# Patient Record
Sex: FEMALE | Race: WHITE | Employment: FULL TIME | URBAN - METROPOLITAN AREA
[De-identification: names, ages, dates, MRNs, and addresses within clinical notes are randomized per-mention and may not be internally consistent; named-entity substitution may affect disease eponyms.]

---

## 2017-01-20 ENCOUNTER — TELEPHONE (OUTPATIENT)
Dept: NEUROLOGY | Age: 57
End: 2017-01-20

## 2018-05-21 ENCOUNTER — HOSPITAL ENCOUNTER (OUTPATIENT)
Dept: CT IMAGING | Age: 58
Discharge: HOME | End: 2018-05-21
Admitting: FAMILY MEDICINE

## 2018-05-21 DIAGNOSIS — R10.31 RLQ ABDOMINAL PAIN: ICD-10-CM

## 2018-05-21 DIAGNOSIS — R10.31 RIGHT LOWER QUADRANT PAIN: ICD-10-CM

## 2018-05-21 PROBLEM — K35.80 ACUTE APPENDICITIS: Status: ACTIVE | Noted: 2018-05-21

## 2018-05-21 LAB
ANION GAP SERPL CALCULATED.3IONS-SCNC: 11 MMOL/L (ref 3–16)
BUN BLDV-MCNC: 20 MG/DL (ref 7–20)
CALCIUM SERPL-MCNC: 9.4 MG/DL (ref 8.3–10.6)
CHLORIDE BLD-SCNC: 101 MMOL/L (ref 99–110)
CO2: 26 MMOL/L (ref 21–32)
CREAT SERPL-MCNC: 0.8 MG/DL (ref 0.6–1.1)
GFR AFRICAN AMERICAN: >60
GFR NON-AFRICAN AMERICAN: >60
GLUCOSE BLD-MCNC: 163 MG/DL (ref 70–99)
HCT VFR BLD CALC: 44.1 % (ref 36–48)
HEMOGLOBIN: 14.7 G/DL (ref 12–16)
MCH RBC QN AUTO: 27.9 PG (ref 26–34)
MCHC RBC AUTO-ENTMCNC: 33.3 G/DL (ref 31–36)
MCV RBC AUTO: 83.6 FL (ref 80–100)
PDW BLD-RTO: 13.6 % (ref 12.4–15.4)
PLATELET # BLD: 186 K/UL (ref 135–450)
PMV BLD AUTO: 10.3 FL (ref 5–10.5)
POTASSIUM SERPL-SCNC: 4.3 MMOL/L (ref 3.5–5.1)
RBC # BLD: 5.27 M/UL (ref 4–5.2)
SODIUM BLD-SCNC: 138 MMOL/L (ref 136–145)
WBC # BLD: 11.4 K/UL (ref 4–11)

## 2018-05-22 ENCOUNTER — HOSPITAL ENCOUNTER (OUTPATIENT)
Dept: CT IMAGING | Age: 58
Discharge: HOME | End: 2018-05-23
Attending: FAMILY MEDICINE | Admitting: FAMILY MEDICINE

## 2018-05-22 DIAGNOSIS — R10.31 RIGHT LOWER QUADRANT PAIN: ICD-10-CM

## 2018-05-22 DIAGNOSIS — R10.31 RLQ ABDOMINAL PAIN: ICD-10-CM

## 2018-06-06 ENCOUNTER — OFFICE VISIT (OUTPATIENT)
Dept: SURGERY | Age: 58
End: 2018-06-06

## 2018-06-06 VITALS — DIASTOLIC BLOOD PRESSURE: 62 MMHG | SYSTOLIC BLOOD PRESSURE: 118 MMHG

## 2018-06-06 DIAGNOSIS — K35.80 ACUTE APPENDICITIS, UNCOMPLICATED: Primary | ICD-10-CM

## 2018-06-06 PROCEDURE — 99024 POSTOP FOLLOW-UP VISIT: CPT | Performed by: SURGERY

## 2018-06-13 ENCOUNTER — OFFICE VISIT (OUTPATIENT)
Dept: SURGERY | Age: 58
End: 2018-06-13

## 2018-06-13 VITALS — SYSTOLIC BLOOD PRESSURE: 120 MMHG | DIASTOLIC BLOOD PRESSURE: 60 MMHG

## 2018-06-13 DIAGNOSIS — K35.80 ACUTE APPENDICITIS, UNCOMPLICATED: Primary | ICD-10-CM

## 2018-06-13 PROCEDURE — 99024 POSTOP FOLLOW-UP VISIT: CPT | Performed by: SURGERY

## 2018-07-05 ENCOUNTER — TELEPHONE (OUTPATIENT)
Dept: PRIMARY CARE CLINIC | Age: 58
End: 2018-07-05

## 2018-07-05 RX ORDER — FLUCONAZOLE 100 MG/1
100 TABLET ORAL DAILY
Qty: 1 TABLET | Refills: 0 | Status: SHIPPED | OUTPATIENT
Start: 2018-07-05 | End: 2018-07-12 | Stop reason: ALTCHOICE

## 2018-07-10 ENCOUNTER — OFFICE VISIT (OUTPATIENT)
Dept: PRIMARY CARE CLINIC | Age: 58
End: 2018-07-10

## 2018-07-10 VITALS
HEIGHT: 65 IN | WEIGHT: 128 LBS | DIASTOLIC BLOOD PRESSURE: 84 MMHG | BODY MASS INDEX: 21.33 KG/M2 | HEART RATE: 76 BPM | SYSTOLIC BLOOD PRESSURE: 132 MMHG | RESPIRATION RATE: 14 BRPM

## 2018-07-10 DIAGNOSIS — N30.00 ACUTE CYSTITIS WITHOUT HEMATURIA: Primary | ICD-10-CM

## 2018-07-10 DIAGNOSIS — N76.0 ACUTE VAGINITIS: ICD-10-CM

## 2018-07-10 DIAGNOSIS — N88.9 CERVICAL LESION: ICD-10-CM

## 2018-07-10 DIAGNOSIS — N39.3 URINARY, INCONTINENCE, STRESS FEMALE: ICD-10-CM

## 2018-07-10 DIAGNOSIS — Z11.3 ROUTINE SCREENING FOR STI (SEXUALLY TRANSMITTED INFECTION): ICD-10-CM

## 2018-07-10 LAB
BILIRUBIN, POC: NORMAL
BLOOD URINE, POC: NORMAL
CLARITY, POC: CLEAR
COLOR, POC: YELLOW
GLUCOSE URINE, POC: NORMAL
KETONES, POC: NORMAL
LEUKOCYTE EST, POC: NORMAL
NITRITE, POC: NORMAL
PH, POC: 7
PROTEIN, POC: NORMAL
SPECIFIC GRAVITY, POC: 1.02
UROBILINOGEN, POC: NORMAL

## 2018-07-10 PROCEDURE — 81002 URINALYSIS NONAUTO W/O SCOPE: CPT | Performed by: NURSE PRACTITIONER

## 2018-07-10 PROCEDURE — 87899 AGENT NOS ASSAY W/OPTIC: CPT | Performed by: NURSE PRACTITIONER

## 2018-07-10 PROCEDURE — 99214 OFFICE O/P EST MOD 30 MIN: CPT | Performed by: NURSE PRACTITIONER

## 2018-07-10 ASSESSMENT — ENCOUNTER SYMPTOMS
SHORTNESS OF BREATH: 0
CHEST TIGHTNESS: 0
COUGH: 0
DIARRHEA: 0
RECTAL PAIN: 1
NAUSEA: 0
CONSTIPATION: 0

## 2018-07-10 ASSESSMENT — PATIENT HEALTH QUESTIONNAIRE - PHQ9
SUM OF ALL RESPONSES TO PHQ QUESTIONS 1-9: 0
SUM OF ALL RESPONSES TO PHQ9 QUESTIONS 1 & 2: 0
1. LITTLE INTEREST OR PLEASURE IN DOING THINGS: 0
2. FEELING DOWN, DEPRESSED OR HOPELESS: 0

## 2018-07-10 NOTE — PROGRESS NOTES
colonoscopy  05/24/2009    Lipid screen  10/11/2015    Flu vaccine (1) 09/01/2018      Social History     Social History    Marital status:      Spouse name: N/A    Number of children: N/A    Years of education: N/A     Social History Main Topics    Smoking status: Never Smoker    Smokeless tobacco: Never Used    Alcohol use Yes      Comment: occas    Drug use: No    Sexual activity: Not Asked     Other Topics Concern    None     Social History Narrative    None     Family History   Problem Relation Age of Onset    Hypertension Other     Stroke Other     Diabetes Other     Hypertension Mother     Depression Mother     Heart Disease Mother     Hypertension Father     High Cholesterol Father     Depression Father     Stroke Father     Migraines Child     Depression Child     Anxiety Disorder Child      Patient Active Problem List   Diagnosis    Migraine with aura, intractable    Migraine without aura, intractable    Atrial fibrillation (HCC)    Weight gain due to medication    Insomnia    Chest pain    Cervicalgia    Intractable migraine with aura without status migrainosus    Acute appendicitis, uncomplicated        LABS:  Admission on 05/21/2018, Discharged on 05/23/2018   Component Date Value Ref Range Status    WBC 05/21/2018 10.1  4.0 - 11.0 K/uL Final    RBC 05/21/2018 5.28* 4.00 - 5.20 M/uL Final    Hemoglobin 05/21/2018 14.7  12.0 - 16.0 g/dL Final    Hematocrit 05/21/2018 44.0  36.0 - 48.0 % Final    MCV 05/21/2018 83.3  80.0 - 100.0 fL Final    MCH 05/21/2018 27.8  26.0 - 34.0 pg Final    MCHC 05/21/2018 33.3  31.0 - 36.0 g/dL Final    RDW 05/21/2018 13.7  12.4 - 15.4 % Final    Platelets 78/70/8430 185  135 - 450 K/uL Final    MPV 05/21/2018 10.0  5.0 - 10.5 fL Final    Sodium 05/21/2018 137  136 - 145 mmol/L Final    Potassium 05/21/2018 3.9  3.5 - 5.1 mmol/L Final    Chloride 05/21/2018 103  99 - 110 mmol/L Final    CO2 05/21/2018 22  21 - 32 mmol/L Final    Anion Gap 05/21/2018 12  3 - 16 Final    Glucose 05/21/2018 98  70 - 99 mg/dL Final    BUN 05/21/2018 19  7 - 20 mg/dL Final    CREATININE 05/21/2018 0.8  0.6 - 1.1 mg/dL Final    GFR Non- 05/21/2018 >60  >60 Final    Comment: >60 mL/min/1.73m2 EGFR, calc. for ages 25 and older using the  MDRD formula (not corrected for weight), is valid for stable  renal function.  GFR  05/21/2018 >60  >60 Final    Comment: Chronic Kidney Disease: less than 60 ml/min/1.73 sq.m. Kidney Failure: less than 15 ml/min/1.73 sq.m. Results valid for patients 18 years and older.       Calcium 05/21/2018 9.3  8.3 - 10.6 mg/dL Final    Ventricular Rate 05/22/2018 67  BPM Final    Atrial Rate 05/22/2018 67  BPM Final    P-R Interval 05/22/2018 144  ms Final    QRS Duration 05/22/2018 90  ms Final    Q-T Interval 05/22/2018 380  ms Final    QTc Calculation (Bazett) 05/22/2018 401  ms Final    P Axis 05/22/2018 38  degrees Final    R Axis 05/22/2018 58  degrees Final    T Axis 05/22/2018 -23  degrees Final    Diagnosis 05/22/2018    Final                    Value:Normal sinus rhythm  ST & T wave abnormality, consider inferior ischemia  Abnormal ECG  When compared with ECG of 02-JUN-2015 13:46,  Aberrant conduction is no longer Present  T wave inversion no longer evident in Lateral leads  Confirmed by Farhad Bautista MD, Arnulfo Calderon ([id number]) on 5/23/2018 12:19:03 PM      WBC 05/23/2018 9.8  4.0 - 11.0 K/uL Final    RBC 05/23/2018 4.76  4.00 - 5.20 M/uL Final    Hemoglobin 05/23/2018 13.3  12.0 - 16.0 g/dL Final    Hematocrit 05/23/2018 40.0  36.0 - 48.0 % Final    MCV 05/23/2018 84.0  80.0 - 100.0 fL Final    MCH 05/23/2018 28.0  26.0 - 34.0 pg Final    MCHC 05/23/2018 33.3  31.0 - 36.0 g/dL Final    RDW 05/23/2018 13.6  12.4 - 15.4 % Final    Platelets 08/10/7673 164  135 - 450 K/uL Final    MPV 05/23/2018 10.2  5.0 - 10.5 fL Final          PHYSICAL EXAM  /84 (Site: Right

## 2018-07-11 LAB
HIV AG/AB: NORMAL
HIV ANTIGEN: NORMAL
HIV-1 ANTIBODY: NORMAL
HIV-2 AB: NORMAL

## 2018-07-12 DIAGNOSIS — B37.31 CANDIDA VAGINITIS: Primary | ICD-10-CM

## 2018-07-12 LAB
C TRACH DNA GENITAL QL NAA+PROBE: NEGATIVE
HPV COMMENT: NORMAL
HPV TYPE 16: NOT DETECTED
HPV TYPE 18: NOT DETECTED
HPVOH (OTHER TYPES): NOT DETECTED
N. GONORRHOEAE DNA: NEGATIVE
ORGANISM: ABNORMAL
URINE CULTURE, ROUTINE: ABNORMAL
URINE CULTURE, ROUTINE: ABNORMAL

## 2018-07-12 RX ORDER — FLUCONAZOLE 150 MG/1
150 TABLET ORAL DAILY
Qty: 7 TABLET | Refills: 0 | Status: SHIPPED | OUTPATIENT
Start: 2018-07-12 | End: 2018-12-05

## 2018-07-13 LAB — RPR TITER: NORMAL

## 2018-07-25 VITALS
HEIGHT: 65 IN | TEMPERATURE: 97.9 F | SYSTOLIC BLOOD PRESSURE: 136 MMHG | WEIGHT: 131 LBS | DIASTOLIC BLOOD PRESSURE: 84 MMHG | RESPIRATION RATE: 16 BRPM | BODY MASS INDEX: 21.83 KG/M2 | HEART RATE: 72 BPM

## 2018-12-05 ENCOUNTER — OFFICE VISIT (OUTPATIENT)
Dept: PRIMARY CARE CLINIC | Age: 58
End: 2018-12-05
Payer: COMMERCIAL

## 2018-12-05 VITALS
RESPIRATION RATE: 16 BRPM | HEIGHT: 65 IN | WEIGHT: 130 LBS | SYSTOLIC BLOOD PRESSURE: 114 MMHG | BODY MASS INDEX: 21.66 KG/M2 | HEART RATE: 76 BPM | DIASTOLIC BLOOD PRESSURE: 76 MMHG

## 2018-12-05 DIAGNOSIS — M79.671 RIGHT FOOT PAIN: ICD-10-CM

## 2018-12-05 DIAGNOSIS — K76.9 HEPATIC LESION: ICD-10-CM

## 2018-12-05 DIAGNOSIS — I48.91 ATRIAL FIBRILLATION, UNSPECIFIED TYPE (HCC): Primary | ICD-10-CM

## 2018-12-05 DIAGNOSIS — I48.91 ATRIAL FIBRILLATION, UNSPECIFIED TYPE (HCC): ICD-10-CM

## 2018-12-05 DIAGNOSIS — M76.821 POSTERIOR TIBIAL TENDINITIS OF RIGHT LEG: ICD-10-CM

## 2018-12-05 LAB
ALBUMIN SERPL-MCNC: 4.7 G/DL (ref 3.4–5)
ALP BLD-CCNC: 63 U/L (ref 40–129)
ALT SERPL-CCNC: 13 U/L (ref 10–40)
AST SERPL-CCNC: 14 U/L (ref 15–37)
BILIRUB SERPL-MCNC: 0.5 MG/DL (ref 0–1)
BILIRUBIN DIRECT: <0.2 MG/DL (ref 0–0.3)
BILIRUBIN, INDIRECT: ABNORMAL MG/DL (ref 0–1)
DIGOXIN LEVEL: 1.8 NG/ML (ref 0.8–2)
TOTAL PROTEIN: 7 G/DL (ref 6.4–8.2)

## 2018-12-05 PROCEDURE — 99214 OFFICE O/P EST MOD 30 MIN: CPT | Performed by: NURSE PRACTITIONER

## 2018-12-05 RX ORDER — DIGOXIN 250 MCG
250 TABLET ORAL DAILY
Qty: 90 TABLET | Refills: 1 | Status: SHIPPED | OUTPATIENT
Start: 2018-12-05 | End: 2019-06-21 | Stop reason: SDUPTHER

## 2018-12-05 ASSESSMENT — ENCOUNTER SYMPTOMS
SHORTNESS OF BREATH: 0
CONSTIPATION: 0
COUGH: 0
DIARRHEA: 0
CHEST TIGHTNESS: 0
NAUSEA: 0
ABDOMINAL PAIN: 0
TROUBLE SWALLOWING: 0

## 2018-12-05 NOTE — PROGRESS NOTES
right leg  Discussed MRI with pt and instructed to dc use of brace if causing more pain and to obtain supportive shoes/inserts - [employer] option for purchase  F/U with Dr Rebekah Chan prn   Right foot pain  Supportive inserts  Ice  NSAIDs prn  Follow up with Dr Rebekah Chan prn  Hepatic lesion  -     Hepatic Function Panel; Future        Return in about 6 months (around 6/5/2019) for Afib. Reviewed and/or ordered clinicallab results Yes  Reviewed and/or ordered radiology tests No  Reviewed and/or ordered other diagnostic tests Yes  Discussed test results with performing physicianNo  Independently reviewed image, tracing, or specimen Yes  Made a decision to obtain old records No  Reviewed and summarized old records Yes      Steve Case was counseled regarding symptoms of current diagnosis, course and complications of disease if inadequately treated, side effects of medications, diagnosis, treatment options, andprognosis, risks, benefits, complications, and alternatives of treatment, labs, imaging and other studies and treatment targets and goals. She understands instructions and counseling. This dictation was performed with a verbal recognition program (DRAGON) and it was checked for errors. It is possible that there are still dictated errors within this office note. If so, pleasebring any errors to my attention for an addendum. All efforts were made to ensure that this office note is accurate.

## 2019-06-21 DIAGNOSIS — I48.91 ATRIAL FIBRILLATION, UNSPECIFIED TYPE (HCC): ICD-10-CM

## 2019-06-21 RX ORDER — DIGOXIN 250 MCG
TABLET ORAL
Qty: 90 TABLET | Refills: 0 | Status: SHIPPED | OUTPATIENT
Start: 2019-06-21 | End: 2019-09-14 | Stop reason: SDUPTHER

## 2019-09-14 DIAGNOSIS — I48.91 ATRIAL FIBRILLATION, UNSPECIFIED TYPE (HCC): ICD-10-CM

## 2019-09-16 RX ORDER — DIGOXIN 250 MCG
TABLET ORAL
Qty: 90 TABLET | Refills: 0 | Status: SHIPPED | OUTPATIENT
Start: 2019-09-16 | End: 2020-05-20

## 2020-02-04 ENCOUNTER — OFFICE VISIT (OUTPATIENT)
Dept: PRIMARY CARE CLINIC | Age: 60
End: 2020-02-04
Payer: COMMERCIAL

## 2020-02-04 VITALS
WEIGHT: 136 LBS | TEMPERATURE: 97.9 F | BODY MASS INDEX: 22.63 KG/M2 | DIASTOLIC BLOOD PRESSURE: 78 MMHG | SYSTOLIC BLOOD PRESSURE: 122 MMHG | OXYGEN SATURATION: 96 % | HEART RATE: 75 BPM

## 2020-02-04 PROCEDURE — 99213 OFFICE O/P EST LOW 20 MIN: CPT | Performed by: INTERNAL MEDICINE

## 2020-02-04 RX ORDER — AMOXICILLIN 500 MG/1
500 CAPSULE ORAL 3 TIMES DAILY
Qty: 30 CAPSULE | Refills: 0 | Status: SHIPPED | OUTPATIENT
Start: 2020-02-04 | End: 2020-02-14

## 2020-02-04 RX ORDER — SULFACETAMIDE SODIUM 100 MG/ML
2 SOLUTION/ DROPS OPHTHALMIC 4 TIMES DAILY
Qty: 1 BOTTLE | Refills: 0 | Status: SHIPPED | OUTPATIENT
Start: 2020-02-04 | End: 2020-02-14

## 2020-02-04 NOTE — PROGRESS NOTES
2/4/2020   Avila Crajaren  1960    The patients PMH, surgical history, family history, medications, allergies were all reviewed and updated as appropriate today. Current Outpatient Medications on File Prior to Visit   Medication Sig Dispense Refill    digoxin (LANOXIN) 250 MCG tablet TAKE 1 TABLET BY MOUTH EVERY DAY 90 tablet 0    topiramate (TOPAMAX) 50 MG tablet TAKE 1 TABLET BY MOUTH EVERY MORNING AND TAKE 1 AND 1/2 TABLETS EVERY EVENING 225 tablet 2    SUMAtriptan (IMITREX) 100 MG tablet Take 1 tab PO PRN for migraine, may repeat once after 2 hrs, not to exceed 2 doses in 24 hrs. 9 tablet 11    melatonin 5 MG TABS tablet Take 5 mg by mouth nightly      Coenzyme Q10 (COQ10 PO) Take 300 mg by mouth daily.  magnesium (MAGNESIUM-OXIDE) 250 MG TABS tablet Take 250 mg by mouth daily       Multiple Vitamins-Minerals (THERAPEUTIC MULTIVITAMIN-MINERALS) tablet Take 1 tablet by mouth daily. No current facility-administered medications on file prior to visit. Chief Complaint   Patient presents with    Sinus Problem     went to urgent care on 1/30/20 was tested for strep and it was negative, was given cetirizine 10 mg tablets QD, and fluticasone. has had recurrent sinus issues and typically takes 30 mg of decongestant and that normally works for her. still has a cough and sore throat, and now thinks she has pink eye in her left eye. has had sick contacts with strep. HPI: Patient presents today after going to a local urgent care, she was diagnosed as having a viral URI and was not given any antibiotics. She comes in today stating that she still has a cough and sore throat and also thinks that she has conjunctivitis, she mentions that she has had contact with other individuals with strep throat.     Review of Systems    OBJECTIVE:  /78 (Site: Right Upper Arm, Position: Sitting, Cuff Size: Medium Adult)   Pulse 75   Temp 97.9 °F (36.6 °C) (Skin)   Wt 136 lb (61.7 kg)   SpO2

## 2020-02-10 ENCOUNTER — TELEPHONE (OUTPATIENT)
Dept: PRIMARY CARE CLINIC | Age: 60
End: 2020-02-10

## 2020-02-10 RX ORDER — CIPROFLOXACIN HYDROCHLORIDE 3.5 MG/ML
1 SOLUTION/ DROPS TOPICAL
Qty: 120 DROP | Refills: 0 | Status: SHIPPED | OUTPATIENT
Start: 2020-02-10 | End: 2020-02-20

## 2020-02-10 NOTE — TELEPHONE ENCOUNTER
Then please stop Bleph-10    Change to Cipro Ophth qtts  May need Ophth eval if this eye drop doesn't clear things up    Brandie Avelar

## 2020-02-10 NOTE — TELEPHONE ENCOUNTER
Pt called states sulfacetamide (BLEPH-10) 10 % ophthalmic solution that was sent on 02/04/20 doesn't work,not helping at all, its getting worst, more redness now. Pt would like to know if she can stop using, need advised please.

## 2020-05-20 RX ORDER — DIGOXIN 250 MCG
TABLET ORAL
Qty: 90 TABLET | Refills: 0 | Status: SHIPPED | OUTPATIENT
Start: 2020-05-20 | End: 2020-08-11

## 2020-08-11 RX ORDER — DIGOXIN 250 MCG
TABLET ORAL
Qty: 90 TABLET | Refills: 0 | Status: SHIPPED | OUTPATIENT
Start: 2020-08-11 | End: 2020-11-02

## 2020-11-02 RX ORDER — DIGOXIN 250 MCG
TABLET ORAL
Qty: 90 TABLET | Refills: 0 | Status: SHIPPED | OUTPATIENT
Start: 2020-11-02 | End: 2021-01-26

## 2021-01-26 DIAGNOSIS — I48.91 ATRIAL FIBRILLATION, UNSPECIFIED TYPE (HCC): ICD-10-CM

## 2021-01-26 RX ORDER — DIGOXIN 250 MCG
TABLET ORAL
Qty: 90 TABLET | Refills: 0 | Status: SHIPPED | OUTPATIENT
Start: 2021-01-26 | End: 2021-02-08

## 2021-02-06 DIAGNOSIS — I48.91 ATRIAL FIBRILLATION, UNSPECIFIED TYPE (HCC): ICD-10-CM

## 2021-02-08 RX ORDER — DIGOXIN 250 MCG
TABLET ORAL
Qty: 90 TABLET | Refills: 0 | Status: SHIPPED | OUTPATIENT
Start: 2021-02-08

## 2021-02-08 NOTE — TELEPHONE ENCOUNTER
Requested Prescriptions     Pending Prescriptions Disp Refills    digoxin (LANOXIN) 250 MCG tablet [Pharmacy Med Name: DIGOXIN 250 MCG TABLET] 90 tablet 0     Sig: TAKE 1 TABLET BY MOUTH EVERY DAY      Last OV 2/4/2020

## 2021-12-27 ENCOUNTER — OFFICE VISIT (OUTPATIENT)
Dept: PRIMARY CARE CLINIC | Age: 61
End: 2021-12-27
Payer: COMMERCIAL

## 2021-12-27 VITALS
DIASTOLIC BLOOD PRESSURE: 80 MMHG | HEIGHT: 65 IN | HEART RATE: 77 BPM | BODY MASS INDEX: 21.33 KG/M2 | SYSTOLIC BLOOD PRESSURE: 124 MMHG | WEIGHT: 128 LBS

## 2021-12-27 DIAGNOSIS — R10.13 DYSPEPSIA: Primary | ICD-10-CM

## 2021-12-27 DIAGNOSIS — R10.13 DYSPEPSIA: ICD-10-CM

## 2021-12-27 LAB
A/G RATIO: 1.7 (ref 1.1–2.2)
ALBUMIN SERPL-MCNC: 4.7 G/DL (ref 3.4–5)
ALP BLD-CCNC: 97 U/L (ref 40–129)
ALT SERPL-CCNC: 20 U/L (ref 10–40)
AMYLASE: 73 U/L (ref 25–115)
ANION GAP SERPL CALCULATED.3IONS-SCNC: 14 MMOL/L (ref 3–16)
AST SERPL-CCNC: 20 U/L (ref 15–37)
BASOPHILS ABSOLUTE: 0 K/UL (ref 0–0.2)
BASOPHILS RELATIVE PERCENT: 0.6 %
BILIRUB SERPL-MCNC: 0.6 MG/DL (ref 0–1)
BUN BLDV-MCNC: 17 MG/DL (ref 7–20)
CALCIUM SERPL-MCNC: 9.7 MG/DL (ref 8.3–10.6)
CHLORIDE BLD-SCNC: 108 MMOL/L (ref 99–110)
CO2: 22 MMOL/L (ref 21–32)
CREAT SERPL-MCNC: 0.9 MG/DL (ref 0.6–1.2)
EOSINOPHILS ABSOLUTE: 0.1 K/UL (ref 0–0.6)
EOSINOPHILS RELATIVE PERCENT: 1.2 %
GFR AFRICAN AMERICAN: >60
GFR NON-AFRICAN AMERICAN: >60
GLUCOSE BLD-MCNC: 93 MG/DL (ref 70–99)
HCT VFR BLD CALC: 43.3 % (ref 36–48)
HEMOGLOBIN: 14.6 G/DL (ref 12–16)
LYMPHOCYTES ABSOLUTE: 1.3 K/UL (ref 1–5.1)
LYMPHOCYTES RELATIVE PERCENT: 17.4 %
MCH RBC QN AUTO: 28.9 PG (ref 26–34)
MCHC RBC AUTO-ENTMCNC: 33.7 G/DL (ref 31–36)
MCV RBC AUTO: 85.6 FL (ref 80–100)
MONOCYTES ABSOLUTE: 0.3 K/UL (ref 0–1.3)
MONOCYTES RELATIVE PERCENT: 3.9 %
NEUTROPHILS ABSOLUTE: 6 K/UL (ref 1.7–7.7)
NEUTROPHILS RELATIVE PERCENT: 76.9 %
PDW BLD-RTO: 13.5 % (ref 12.4–15.4)
PLATELET # BLD: 183 K/UL (ref 135–450)
PMV BLD AUTO: 11.8 FL (ref 5–10.5)
POTASSIUM SERPL-SCNC: 4 MMOL/L (ref 3.5–5.1)
RBC # BLD: 5.06 M/UL (ref 4–5.2)
SODIUM BLD-SCNC: 144 MMOL/L (ref 136–145)
TOTAL PROTEIN: 7.4 G/DL (ref 6.4–8.2)
WBC # BLD: 7.7 K/UL (ref 4–11)

## 2021-12-27 PROCEDURE — 99214 OFFICE O/P EST MOD 30 MIN: CPT | Performed by: INTERNAL MEDICINE

## 2021-12-27 RX ORDER — PANTOPRAZOLE SODIUM 40 MG/1
40 TABLET, DELAYED RELEASE ORAL
Qty: 30 TABLET | Refills: 0 | Status: SHIPPED | OUTPATIENT
Start: 2021-12-27 | End: 2022-01-20

## 2021-12-27 ASSESSMENT — PATIENT HEALTH QUESTIONNAIRE - PHQ9
SUM OF ALL RESPONSES TO PHQ9 QUESTIONS 1 & 2: 0
SUM OF ALL RESPONSES TO PHQ QUESTIONS 1-9: 0
1. LITTLE INTEREST OR PLEASURE IN DOING THINGS: 0
SUM OF ALL RESPONSES TO PHQ QUESTIONS 1-9: 0
SUM OF ALL RESPONSES TO PHQ QUESTIONS 1-9: 0
2. FEELING DOWN, DEPRESSED OR HOPELESS: 0

## 2021-12-27 NOTE — PROGRESS NOTES
12/27/2021   Adelina Baker  1960    The patients PMH, surgical history, family history, medications, allergies were all reviewed and updated as appropriate today. Current Outpatient Medications on File Prior to Visit   Medication Sig Dispense Refill    digoxin (LANOXIN) 250 MCG tablet TAKE 1 TABLET BY MOUTH EVERY DAY 90 tablet 0    topiramate (TOPAMAX) 50 MG tablet TAKE 1 TABLET BY MOUTH EVERY MORNING AND TAKE 1 AND 1/2 TABLETS EVERY EVENING 225 tablet 2    SUMAtriptan (IMITREX) 100 MG tablet Take 1 tab PO PRN for migraine, may repeat once after 2 hrs, not to exceed 2 doses in 24 hrs. 9 tablet 11    melatonin 5 MG TABS tablet Take 5 mg by mouth nightly      Coenzyme Q10 (COQ10 PO) Take 300 mg by mouth daily.  magnesium (MAGNESIUM-OXIDE) 250 MG TABS tablet Take 250 mg by mouth daily       Multiple Vitamins-Minerals (THERAPEUTIC MULTIVITAMIN-MINERALS) tablet Take 1 tablet by mouth daily. No current facility-administered medications on file prior to visit. /80 (Site: Left Upper Arm, Position: Sitting, Cuff Size: Medium Adult)   Pulse 77   Ht 5' 5\" (1.651 m)   Wt 128 lb (58.1 kg)   BMI 21.30 kg/m²       HPI:  EMERGENCY VISIT  Last seen almost 2 years ago  RIGHT Upper abd pain \"feels like when I had my gastritis several years ago\"  Hasn't tried any OTC interventions yet  Had gastritis in 2019 with Cucinotta due to XS stress    Review of Systems    OBJECTIVE:  /80 (Site: Left Upper Arm, Position: Sitting, Cuff Size: Medium Adult)   Pulse 77   Ht 5' 5\" (1.651 m)   Wt 128 lb (58.1 kg)   BMI 21.30 kg/m²     Physical Exam  Vitals and nursing note reviewed. Constitutional:       General: She is not in acute distress. Appearance: Normal appearance. She is well-developed. HENT:      Head: Normocephalic and atraumatic.       Right Ear: Tympanic membrane, ear canal and external ear normal.      Left Ear: Tympanic membrane, ear canal and external ear normal.      Nose: Nose normal. No rhinorrhea. Mouth/Throat:      Pharynx: No oropharyngeal exudate or posterior oropharyngeal erythema. Eyes:      General:         Right eye: No discharge. Left eye: No discharge. Extraocular Movements: Extraocular movements intact. Conjunctiva/sclera: Conjunctivae normal.      Pupils: Pupils are equal, round, and reactive to light. Neck:      Thyroid: No thyromegaly. Vascular: No carotid bruit or JVD. Cardiovascular:      Rate and Rhythm: Normal rate and regular rhythm. Pulses: Normal pulses. Heart sounds: Normal heart sounds. No murmur heard. Pulmonary:      Effort: Pulmonary effort is normal. No respiratory distress. Breath sounds: Normal breath sounds. No wheezing, rhonchi or rales. Abdominal:      General: Bowel sounds are normal. There is no distension. Palpations: Abdomen is soft. Tenderness: There is abdominal tenderness. There is no rebound. Comments: Mild epigastric tenderness   Musculoskeletal:         General: No swelling. Cervical back: Normal range of motion. No muscular tenderness. Right lower leg: No edema. Left lower leg: No edema. Comments: FROM x 4   Lymphadenopathy:      Cervical: No cervical adenopathy. Skin:     General: Skin is warm and dry. Capillary Refill: Capillary refill takes 2 to 3 seconds. Coloration: Skin is not pale. Findings: No erythema or rash. Neurological:      Mental Status: She is alert and oriented to person, place, and time. Cranial Nerves: No cranial nerve deficit. Sensory: No sensory deficit. Motor: No abnormal muscle tone. Deep Tendon Reflexes: Reflexes normal.   Psychiatric:         Mood and Affect: Mood normal.         Behavior: Behavior normal.         Thought Content:  Thought content normal.         Judgment: Judgment normal.         Data Review:   CBC:   Lab Results   Component Value Date    WBC 9.8 05/23/2018    WBC 10.1 05/21/2018    WBC 11.4 05/21/2018    HGB 13.3 05/23/2018    HGB 14.7 05/21/2018    HGB 14.7 05/21/2018    HCT 40.0 05/23/2018    HCT 44.0 05/21/2018    HCT 44.1 05/21/2018    MCV 84.0 05/23/2018    MCV 83.3 05/21/2018    MCV 83.6 05/21/2018     05/23/2018     05/21/2018     05/21/2018     Chemistry:   Lab Results   Component Value Date     05/21/2018     05/21/2018     09/08/2016    K 3.9 05/21/2018    K 4.3 05/21/2018    K 4.3 09/08/2016     05/21/2018     05/21/2018     09/08/2016    CO2 22 05/21/2018    CO2 26 05/21/2018    CO2 25 09/08/2016    BUN 19 05/21/2018    BUN 20 05/21/2018    BUN 20 09/08/2016    CREATININE 0.8 05/21/2018    CREATININE 0.8 05/21/2018    CREATININE 0.8 09/08/2016     Hepatic Function:   Lab Results   Component Value Date    AST 14 12/05/2018    AST 15 09/08/2016    AST 17 06/02/2015    ALT 13 12/05/2018    ALT 10 09/08/2016    ALT 17 06/02/2015    BILIDIR <0.2 12/05/2018    BILITOT 0.5 12/05/2018    BILITOT 0.5 09/08/2016    BILITOT <0.2 06/02/2015    ALKPHOS 63 12/05/2018    ALKPHOS 80 09/08/2016    ALKPHOS 74 06/02/2015     No results found for: LIPASE, AMYLASE  Lipids:   Lab Results   Component Value Date    CHOL 170 10/11/2010    HDL 58 10/11/2010    TRIG 71 10/11/2010       ASSESSMENT/PLAN  1.) Dyspepsia- sounds like recurrence of previous gastritis symptoms from 2019  Treat empirically with Protonix 40mg QD  I'm exercising for stress management    Stool FOB sent  Check labs  F/u in 1 month    2.)  Saw GYN- re vaginal prolapse, I can't wait for PT till June   I work for [employer]    Im alone by myself with COVID    F/u for further evaluation if symptoms persist/recur after empiric therapy today    Nicholas Helton MD      Electronically signed by Nicholas Helton MD on 12/27/2021 at 12:36 PM

## 2021-12-27 NOTE — PROGRESS NOTES
Pt says about 1.5 was dx with stage 1 prolapse. But wants to discuss possible gastritis. Mostly issues after eating.

## 2021-12-28 ENCOUNTER — NURSE ONLY (OUTPATIENT)
Dept: PRIMARY CARE CLINIC | Age: 61
End: 2021-12-28
Payer: COMMERCIAL

## 2021-12-28 DIAGNOSIS — R10.13 DYSPEPSIA: ICD-10-CM

## 2021-12-28 LAB
CONTROL: POSITIVE
HEMOCCULT STL QL: NEGATIVE

## 2021-12-28 PROCEDURE — 82274 ASSAY TEST FOR BLOOD FECAL: CPT | Performed by: INTERNAL MEDICINE

## 2022-01-03 ENCOUNTER — TELEPHONE (OUTPATIENT)
Dept: PRIMARY CARE CLINIC | Age: 62
End: 2022-01-03

## 2022-01-03 DIAGNOSIS — R10.11 RUQ ABDOMINAL PAIN: Primary | ICD-10-CM

## 2022-01-03 NOTE — TELEPHONE ENCOUNTER
lvm for pt to inform that signed results have not been sent to pcp yet but can be accessed thru care everywhere and we are able to pull them.

## 2022-01-03 NOTE — TELEPHONE ENCOUNTER
----- Message from Christian Busch sent at 1/3/2022  7:05 AM EST -----  Subject: Message to Provider    QUESTIONS  Information for Provider? Pt called due to her UTI symptoms getting worse,   she was last seen for this 12/27. She now has increased pain in her upper   right ABD, and slight fever. Please call with next steps for care.   ---------------------------------------------------------------------------  --------------  CALL BACK INFO  What is the best way for the office to contact you? OK to leave message on   voicemail  Preferred Call Back Phone Number? [phone]  ---------------------------------------------------------------------------  --------------  SCRIPT ANSWERS  Relationship to Patient?  Self

## 2022-01-03 NOTE — TELEPHONE ENCOUNTER
Pt is calling stating she already had ct scans at [facility] is calling to verify that the results where sent over

## 2022-01-03 NOTE — TELEPHONE ENCOUNTER
If she now has RUQ abd pain, then probably should get RUQ US to evaluate her gallbladder    Dahsa Bailey MD

## 2022-01-04 ENCOUNTER — HOSPITAL ENCOUNTER (OUTPATIENT)
Dept: ULTRASOUND IMAGING | Age: 62
Discharge: HOME | End: 2022-01-04
Payer: COMMERCIAL

## 2022-01-04 DIAGNOSIS — R93.2 ABNORMAL LIVER ULTRASOUND: Primary | ICD-10-CM

## 2022-01-04 DIAGNOSIS — R10.11 RUQ ABDOMINAL PAIN: ICD-10-CM

## 2022-01-04 PROCEDURE — 76705 ECHO EXAM OF ABDOMEN: CPT

## 2022-01-04 NOTE — TELEPHONE ENCOUNTER
Pt is calling stating she [facility] for her ultra sound this morning and she stating she looked at the results on my chart pt states she thinking the next step is to go to gastrologist  DR. Branham pt states if you think its a bad idea to please give her to call and advise other wise

## 2022-01-05 ENCOUNTER — TELEPHONE (OUTPATIENT)
Dept: PRIMARY CARE CLINIC | Age: 62
End: 2022-01-05

## 2022-01-12 ENCOUNTER — HOSPITAL ENCOUNTER (OUTPATIENT)
Dept: MRI IMAGING | Age: 62
Discharge: HOME | End: 2022-01-12
Payer: COMMERCIAL

## 2022-01-12 DIAGNOSIS — R93.2 ABNORMAL LIVER ULTRASOUND: ICD-10-CM

## 2022-01-12 PROCEDURE — 6360000004 HC RX CONTRAST MEDICATION: Performed by: INTERNAL MEDICINE

## 2022-01-12 PROCEDURE — 74183 MRI ABD W/O CNTR FLWD CNTR: CPT

## 2022-01-12 PROCEDURE — A9577 INJ MULTIHANCE: HCPCS | Performed by: INTERNAL MEDICINE

## 2022-01-12 PROCEDURE — 2580000003 HC RX 258: Performed by: INTERNAL MEDICINE

## 2022-01-12 RX ORDER — SODIUM CHLORIDE 9 MG/ML
INJECTION, SOLUTION INTRAVENOUS ONCE
Status: COMPLETED | OUTPATIENT
Start: 2022-01-12 | End: 2022-01-12

## 2022-01-12 RX ADMIN — SODIUM CHLORIDE: 9 INJECTION, SOLUTION INTRAVENOUS at 18:53

## 2022-01-12 RX ADMIN — GADOBENATE DIMEGLUMINE 11 ML: 529 INJECTION, SOLUTION INTRAVENOUS at 18:52

## 2022-01-20 DIAGNOSIS — R10.13 DYSPEPSIA: ICD-10-CM

## 2022-01-20 RX ORDER — PANTOPRAZOLE SODIUM 40 MG/1
TABLET, DELAYED RELEASE ORAL
Qty: 30 TABLET | Refills: 0 | Status: SHIPPED | OUTPATIENT
Start: 2022-01-20 | End: 2022-01-27 | Stop reason: SDUPTHER

## 2022-01-24 ENCOUNTER — OFFICE VISIT (OUTPATIENT)
Dept: PRIMARY CARE CLINIC | Age: 62
End: 2022-01-24
Payer: COMMERCIAL

## 2022-01-24 VITALS
DIASTOLIC BLOOD PRESSURE: 72 MMHG | WEIGHT: 128 LBS | HEART RATE: 87 BPM | BODY MASS INDEX: 21.33 KG/M2 | SYSTOLIC BLOOD PRESSURE: 118 MMHG | OXYGEN SATURATION: 97 % | HEIGHT: 65 IN

## 2022-01-24 DIAGNOSIS — R10.13 DYSPEPSIA: ICD-10-CM

## 2022-01-24 DIAGNOSIS — Z11.59 SCREENING FOR VIRAL DISEASE: Primary | ICD-10-CM

## 2022-01-24 PROCEDURE — 99213 OFFICE O/P EST LOW 20 MIN: CPT | Performed by: INTERNAL MEDICINE

## 2022-01-24 RX ORDER — DICYCLOMINE HYDROCHLORIDE 10 MG/1
10 CAPSULE ORAL
COMMUNITY
Start: 2022-01-06

## 2022-01-24 NOTE — PROGRESS NOTES
1/24/2022   Prisca [facility] [place]  1960    The patients PMH, surgical history, family history, medications, allergies were all reviewed and updated as appropriate today. Current Outpatient Medications on File Prior to Visit   Medication Sig Dispense Refill    pantoprazole (PROTONIX) 40 MG tablet TAKE 1 TABLET BY MOUTH EVERY DAY BEFORE BREAKFAST 30 tablet 0    digoxin (LANOXIN) 250 MCG tablet TAKE 1 TABLET BY MOUTH EVERY DAY 90 tablet 0    topiramate (TOPAMAX) 50 MG tablet TAKE 1 TABLET BY MOUTH EVERY MORNING AND TAKE 1 AND 1/2 TABLETS EVERY EVENING 225 tablet 2    SUMAtriptan (IMITREX) 100 MG tablet Take 1 tab PO PRN for migraine, may repeat once after 2 hrs, not to exceed 2 doses in 24 hrs. 9 tablet 11    melatonin 5 MG TABS tablet Take 5 mg by mouth nightly      Coenzyme Q10 (COQ10 PO) Take 300 mg by mouth daily.  magnesium (MAGNESIUM-OXIDE) 250 MG TABS tablet Take 250 mg by mouth daily       Multiple Vitamins-Minerals (THERAPEUTIC MULTIVITAMIN-MINERALS) tablet Take 1 tablet by mouth daily. No current facility-administered medications on file prior to visit. Chief Complaint   Patient presents with    Follow-up     dyspepsia          HPI:  1 month f/u evaluation for c/o dyspepsia, was started on empiric PPI last month withresolution of her symptoms  Cucinotta started dicyclomine 10mg QID   hepatic hemangioma unchanged on MRI  HIDA WNL with Finesse    Review of Systems    OBJECTIVE:  /72   Pulse 87   Ht 5' 5\" (1.651 m)   Wt 128 lb (58.1 kg)   SpO2 97%   BMI 21.30 kg/m²     Physical Exam  Vitals and nursing note reviewed. Constitutional:       General: She is not in acute distress. Appearance: Normal appearance. She is well-developed. HENT:      Head: Normocephalic and atraumatic. Right Ear: Tympanic membrane, ear canal and external ear normal.      Left Ear: Tympanic membrane, ear canal and external ear normal.      Nose: Nose normal. No rhinorrhea. Mouth/Throat:      Pharynx: No oropharyngeal exudate or posterior oropharyngeal erythema. Eyes:      General:         Right eye: No discharge. Left eye: No discharge. Extraocular Movements: Extraocular movements intact. Conjunctiva/sclera: Conjunctivae normal.      Pupils: Pupils are equal, round, and reactive to light. Neck:      Thyroid: No thyromegaly. Vascular: No carotid bruit or JVD. Cardiovascular:      Rate and Rhythm: Normal rate and regular rhythm. Pulses: Normal pulses. Heart sounds: Normal heart sounds. No murmur heard. Pulmonary:      Effort: Pulmonary effort is normal. No respiratory distress. Breath sounds: Normal breath sounds. No wheezing, rhonchi or rales. Abdominal:      General: Bowel sounds are normal. There is no distension. Palpations: Abdomen is soft. Tenderness: There is no abdominal tenderness. There is no rebound. Musculoskeletal:         General: No swelling. Cervical back: Normal range of motion. No muscular tenderness. Right lower leg: No edema. Left lower leg: No edema. Comments: FROM x 4   Lymphadenopathy:      Cervical: No cervical adenopathy. Skin:     General: Skin is warm and dry. Capillary Refill: Capillary refill takes 2 to 3 seconds. Coloration: Skin is not pale. Findings: No erythema or rash. Neurological:      Mental Status: She is alert and oriented to person, place, and time. Cranial Nerves: No cranial nerve deficit. Sensory: No sensory deficit. Motor: No abnormal muscle tone. Deep Tendon Reflexes: Reflexes normal.   Psychiatric:         Mood and Affect: Mood normal.         Behavior: Behavior normal.         Thought Content:  Thought content normal.         Judgment: Judgment normal.         Data Review:   CBC:   Lab Results   Component Value Date    WBC 7.7 12/27/2021    WBC 9.8 05/23/2018    WBC 10.1 05/21/2018    HGB 14.6 12/27/2021    HGB 13.3 05/23/2018    HGB 14.7 05/21/2018    HCT 43.3 12/27/2021    HCT 40.0 05/23/2018    HCT 44.0 05/21/2018    MCV 85.6 12/27/2021    MCV 84.0 05/23/2018    MCV 83.3 05/21/2018     12/27/2021     05/23/2018     05/21/2018     Chemistry:   Lab Results   Component Value Date     12/27/2021     05/21/2018     05/21/2018    K 4.0 12/27/2021    K 3.9 05/21/2018    K 4.3 05/21/2018     12/27/2021     05/21/2018     05/21/2018    CO2 22 12/27/2021    CO2 22 05/21/2018    CO2 26 05/21/2018    BUN 17 12/27/2021    BUN 19 05/21/2018    BUN 20 05/21/2018    CREATININE 0.9 12/27/2021    CREATININE 0.8 05/21/2018    CREATININE 0.8 05/21/2018     Hepatic Function:   Lab Results   Component Value Date    AST 20 12/27/2021    AST 14 12/05/2018    AST 15 09/08/2016    ALT 20 12/27/2021    ALT 13 12/05/2018    ALT 10 09/08/2016    BILIDIR <0.2 12/05/2018    BILITOT 0.6 12/27/2021    BILITOT 0.5 12/05/2018    BILITOT 0.5 09/08/2016    ALKPHOS 97 12/27/2021    ALKPHOS 63 12/05/2018    ALKPHOS 80 09/08/2016     Lab Results   Component Value Date    AMYLASE 73 12/27/2021     Lipids:   Lab Results   Component Value Date    CHOL 170 10/11/2010    HDL 58 10/11/2010    TRIG 71 10/11/2010       ASSESSMENT/PLAN  1.) Dyspepsia- encouraged PPI use prn, taper to Prilosec OTC     dicyclomine as per Dr. Alden Beltran recommendations   advised OTC anti-anxiety supplements prn  \"will be moving to [place] later this Spring\" and stress will be less there. ..     Plan to check fasting labs in future- ordered    Consider Shingrix vaccination series of 2 shots over 2-6 months if desired for protection from shingles-check with [employer] first re: coverage before beginning series  Due for tdap    Rhoda Cranker, MD        Electronically signed by Rhoda Cranker, MD on 1/24/2022 at 2:59 PM

## 2022-01-24 NOTE — PATIENT INSTRUCTIONS
Consider Shingrix vaccination series of 2 shots over 2-6 months if desired for protection from shingles-check with [employer] first re: coverage before beginning series    Due for tetanus TDAP vaccination

## 2022-01-27 ENCOUNTER — TELEPHONE (OUTPATIENT)
Dept: PRIMARY CARE CLINIC | Age: 62
End: 2022-01-27

## 2022-01-27 DIAGNOSIS — R10.13 DYSPEPSIA: ICD-10-CM

## 2022-01-27 RX ORDER — PANTOPRAZOLE SODIUM 40 MG/1
TABLET, DELAYED RELEASE ORAL
Qty: 90 TABLET | Refills: 1 | Status: SHIPPED | OUTPATIENT
Start: 2022-01-27